# Patient Record
Sex: FEMALE | Race: OTHER | HISPANIC OR LATINO | URBAN - METROPOLITAN AREA
[De-identification: names, ages, dates, MRNs, and addresses within clinical notes are randomized per-mention and may not be internally consistent; named-entity substitution may affect disease eponyms.]

---

## 2023-04-20 ENCOUNTER — LAB OUTSIDE AN ENCOUNTER (OUTPATIENT)
Dept: URBAN - METROPOLITAN AREA CLINIC 98 | Facility: CLINIC | Age: 53
End: 2023-04-20

## 2023-04-20 ENCOUNTER — WEB ENCOUNTER (OUTPATIENT)
Dept: URBAN - METROPOLITAN AREA CLINIC 98 | Facility: CLINIC | Age: 53
End: 2023-04-20

## 2023-04-20 ENCOUNTER — OFFICE VISIT (OUTPATIENT)
Dept: URBAN - METROPOLITAN AREA CLINIC 98 | Facility: CLINIC | Age: 53
End: 2023-04-20
Payer: COMMERCIAL

## 2023-04-20 VITALS
TEMPERATURE: 97.5 F | HEART RATE: 82 BPM | BODY MASS INDEX: 32.7 KG/M2 | DIASTOLIC BLOOD PRESSURE: 82 MMHG | HEIGHT: 58 IN | WEIGHT: 155.8 LBS | SYSTOLIC BLOOD PRESSURE: 124 MMHG

## 2023-04-20 DIAGNOSIS — E88.81 METABOLIC SYNDROME: ICD-10-CM

## 2023-04-20 DIAGNOSIS — K21.9 GERD WITHOUT ESOPHAGITIS: ICD-10-CM

## 2023-04-20 DIAGNOSIS — K82.8 BILIARY DYSKINESIA: ICD-10-CM

## 2023-04-20 DIAGNOSIS — Z12.11 COLON CANCER SCREENING: ICD-10-CM

## 2023-04-20 PROCEDURE — 99203 OFFICE O/P NEW LOW 30 MIN: CPT | Performed by: INTERNAL MEDICINE

## 2023-04-20 PROCEDURE — 99243 OFF/OP CNSLTJ NEW/EST LOW 30: CPT | Performed by: INTERNAL MEDICINE

## 2023-04-20 RX ORDER — DEXLANSOPRAZOLE 30 MG/1
1 CAPSULE CAPSULE, DELAYED RELEASE ORAL ONCE A DAY
Qty: 30 | Refills: 3 | OUTPATIENT
Start: 2023-04-23

## 2023-04-20 RX ORDER — SODIUM, POTASSIUM,MAG SULFATES 17.5-3.13G
177ML SOLUTION, RECONSTITUTED, ORAL ORAL AS DIRECTED
Qty: 1 | Refills: 0 | OUTPATIENT
Start: 2023-04-20 | End: 2023-04-22

## 2023-04-20 RX ORDER — PRAVASTATIN SODIUM 20 MG/1
1 TABLET TABLET ORAL ONCE A DAY
Status: ACTIVE | COMMUNITY
Start: 2023-04-23

## 2023-04-20 NOTE — HPI-TODAY'S VISIT:
Patient referred by Rosas Lira MD for evaluation of chornic GERD and CRC screening. Copy of this consult OV sent to Dr. Lira. 53 yo pt e chronic GERD wo alalrm nor noctrunal JIMENA sxs who lately has been c/o frequent throat clearing, sore throat and intermittent dysphagia to solids wo food impaction.  No response to Omrprazole 40 mg p qd. No melenic stools and no hematochezia. Hasn't been able to lose much weight. Intermittent nocturnal JIMENA sxs wo extraesophageal sxs. She's due for CRC screening. No FHx CC / pp  IBD / GI malignancies. Normasl CPE a tr ago. No other complaints.

## 2023-04-21 ENCOUNTER — TELEPHONE ENCOUNTER (OUTPATIENT)
Dept: URBAN - METROPOLITAN AREA CLINIC 98 | Facility: CLINIC | Age: 53
End: 2023-04-21

## 2023-04-21 RX ORDER — DEXLANSOPRAZOLE 30 MG/1
1 CAPSULE CAPSULE, DELAYED RELEASE ORAL ONCE A DAY
Qty: 30 | Refills: 3 | OUTPATIENT
Start: 2023-04-22

## 2023-04-23 ENCOUNTER — DASHBOARD ENCOUNTERS (OUTPATIENT)
Age: 53
End: 2023-04-23

## 2023-04-23 PROBLEM — 266435005: Status: ACTIVE | Noted: 2023-04-20

## 2023-04-23 PROBLEM — 237602007: Status: ACTIVE | Noted: 2023-04-23

## 2023-04-26 ENCOUNTER — TELEPHONE ENCOUNTER (OUTPATIENT)
Dept: URBAN - METROPOLITAN AREA CLINIC 98 | Facility: CLINIC | Age: 53
End: 2023-04-26

## 2023-05-05 ENCOUNTER — TELEPHONE ENCOUNTER (OUTPATIENT)
Dept: URBAN - METROPOLITAN AREA CLINIC 98 | Facility: CLINIC | Age: 53
End: 2023-05-05

## 2023-05-05 RX ORDER — RABEPRAZOLE SODIUM 20 MG/1
1 TABLET TABLET, DELAYED RELEASE ORAL ONCE A DAY
Qty: 30 | Refills: 3 | OUTPATIENT
Start: 2023-05-15

## 2023-07-06 ENCOUNTER — OFFICE VISIT (OUTPATIENT)
Dept: URBAN - METROPOLITAN AREA SURGERY CENTER 18 | Facility: SURGERY CENTER | Age: 53
End: 2023-07-06
Payer: COMMERCIAL

## 2023-07-06 DIAGNOSIS — Z12.11 COLON CANCER SCREENING: ICD-10-CM

## 2023-07-06 PROCEDURE — 992 NON-BILLABLE: Performed by: INTERNAL MEDICINE

## 2023-07-06 RX ORDER — DEXLANSOPRAZOLE 30 MG/1
1 CAPSULE CAPSULE, DELAYED RELEASE ORAL ONCE A DAY
Qty: 30 | Refills: 3 | Status: ACTIVE | COMMUNITY
Start: 2023-04-22

## 2023-07-06 RX ORDER — RABEPRAZOLE SODIUM 20 MG/1
1 TABLET TABLET, DELAYED RELEASE ORAL ONCE A DAY
Qty: 30 | Refills: 3 | Status: ACTIVE | COMMUNITY
Start: 2023-05-15

## 2023-07-06 RX ORDER — PRAVASTATIN SODIUM 20 MG/1
1 TABLET TABLET ORAL ONCE A DAY
Status: ACTIVE | COMMUNITY
Start: 2023-04-23

## 2023-07-06 RX ORDER — DEXLANSOPRAZOLE 30 MG/1
1 CAPSULE CAPSULE, DELAYED RELEASE ORAL ONCE A DAY
Qty: 30 | Refills: 3 | Status: ACTIVE | COMMUNITY
Start: 2023-04-23

## 2024-07-31 ENCOUNTER — TELEPHONE ENCOUNTER (OUTPATIENT)
Dept: URBAN - METROPOLITAN AREA CLINIC 98 | Facility: CLINIC | Age: 54
End: 2024-07-31

## 2024-08-02 ENCOUNTER — ERX REFILL RESPONSE (OUTPATIENT)
Dept: URBAN - METROPOLITAN AREA CLINIC 98 | Facility: CLINIC | Age: 54
End: 2024-08-02

## 2024-08-02 RX ORDER — RABEPRAZOLE SODIUM 20 MG/1
1 TABLET TABLET, DELAYED RELEASE ORAL ONCE A DAY
Qty: 30 | Refills: 3 | OUTPATIENT

## 2024-08-02 RX ORDER — RABEPRAZOLE SODIUM 20 MG/1
TAKE 1 TABLET BY MOUTH ONCE DAILY FOR 30 DAYS TABLET, DELAYED RELEASE ORAL
Qty: 30 TABLET | Refills: 0 | OUTPATIENT

## 2024-08-13 ENCOUNTER — LAB OUTSIDE AN ENCOUNTER (OUTPATIENT)
Dept: URBAN - METROPOLITAN AREA CLINIC 98 | Facility: CLINIC | Age: 54
End: 2024-08-13

## 2024-08-13 ENCOUNTER — OFFICE VISIT (OUTPATIENT)
Dept: URBAN - METROPOLITAN AREA CLINIC 98 | Facility: CLINIC | Age: 54
End: 2024-08-13
Payer: COMMERCIAL

## 2024-08-13 VITALS
DIASTOLIC BLOOD PRESSURE: 94 MMHG | TEMPERATURE: 98.1 F | HEIGHT: 58 IN | BODY MASS INDEX: 33.58 KG/M2 | SYSTOLIC BLOOD PRESSURE: 153 MMHG | WEIGHT: 160 LBS | HEART RATE: 98.1 BPM

## 2024-08-13 DIAGNOSIS — Z12.11 COLON CANCER SCREENING: ICD-10-CM

## 2024-08-13 DIAGNOSIS — K21.9 CHRONIC GERD: ICD-10-CM

## 2024-08-13 PROBLEM — 235595009: Status: ACTIVE | Noted: 2024-08-13

## 2024-08-13 PROCEDURE — 99214 OFFICE O/P EST MOD 30 MIN: CPT | Performed by: INTERNAL MEDICINE

## 2024-08-13 RX ORDER — SODIUM, POTASSIUM,MAG SULFATES 17.5-3.13G
354ML SOLUTION, RECONSTITUTED, ORAL ORAL
Qty: 345 MILLILITER | Refills: 0 | OUTPATIENT
Start: 2024-08-13 | End: 2024-08-14

## 2024-08-13 RX ORDER — DEXLANSOPRAZOLE 30 MG/1
1 CAPSULE CAPSULE, DELAYED RELEASE ORAL ONCE A DAY
Qty: 30 | Refills: 3 | Status: ON HOLD | COMMUNITY
Start: 2023-04-23

## 2024-08-13 RX ORDER — DEXLANSOPRAZOLE 30 MG/1
1 CAPSULE CAPSULE, DELAYED RELEASE ORAL ONCE A DAY
Qty: 30 | Refills: 3 | Status: ON HOLD | COMMUNITY
Start: 2023-04-22

## 2024-08-13 RX ORDER — RABEPRAZOLE SODIUM 20 MG/1
TAKE 1 TABLET BY MOUTH ONCE DAILY FOR 30 DAYS TABLET, DELAYED RELEASE ORAL
Qty: 30 TABLET | Refills: 0 | Status: ON HOLD | COMMUNITY

## 2024-08-13 RX ORDER — PRAVASTATIN SODIUM 20 MG/1
1 TABLET TABLET ORAL ONCE A DAY
Status: ACTIVE | COMMUNITY
Start: 2023-04-23

## 2024-08-13 RX ORDER — OMEPRAZOLE 40 MG/1
1 CAPSULE 30 MINUTES BEFORE MORNING MEAL CAPSULE, DELAYED RELEASE ORAL ONCE A DAY
Qty: 90 | Refills: 3 | Status: ON HOLD | COMMUNITY
Start: 2024-08-01

## 2024-08-13 NOTE — HPI-TODAY'S VISIT:
Patient referred by Rosas Lira MD for evaluation of chronic GERD and CRC screening. Copy of this consult OV sent to Dr. Lira. 54 yo pt w chronic GERD, w gas, flatus, wo alarm nor nocturnal JIMENA sxs who lately has been c/o sore throat and intermittent dysphagia to solids wo food impaction. Partial response to Omeprazole 40 mg p qd. No melenic stools and no hematochezia. Has been Dx w DM2 on Ozempic and Insulin + ADA diet.  Hasn't been able to lose some  weight.  She's due for CRC screening. No FHx CC / pp  IBD / GI malignancies. Normal CPE 3 mos ago x for DM2.Denies cardiorespiratory sxs.  No other complaints.

## 2024-10-03 ENCOUNTER — TELEPHONE ENCOUNTER (OUTPATIENT)
Dept: URBAN - METROPOLITAN AREA CLINIC 98 | Facility: CLINIC | Age: 54
End: 2024-10-03

## 2024-10-07 ENCOUNTER — CLAIMS CREATED FROM THE CLAIM WINDOW (OUTPATIENT)
Dept: URBAN - METROPOLITAN AREA SURGERY CENTER 18 | Facility: SURGERY CENTER | Age: 54
End: 2024-10-07
Payer: COMMERCIAL

## 2024-10-07 ENCOUNTER — CLAIMS CREATED FROM THE CLAIM WINDOW (OUTPATIENT)
Dept: URBAN - METROPOLITAN AREA CLINIC 4 | Facility: CLINIC | Age: 54
End: 2024-10-07
Payer: COMMERCIAL

## 2024-10-07 DIAGNOSIS — Z12.11 COLON CANCER SCREENING: ICD-10-CM

## 2024-10-07 DIAGNOSIS — K63.89 OTHER SPECIFIED DISEASES OF INTESTINE: ICD-10-CM

## 2024-10-07 DIAGNOSIS — Z12.11 COLON CANCER SCREENING (HIGH RISK): ICD-10-CM

## 2024-10-07 DIAGNOSIS — K21.9 GERD: ICD-10-CM

## 2024-10-07 DIAGNOSIS — K63.89 BACTERIAL OVERGROWTH SYNDROME: ICD-10-CM

## 2024-10-07 DIAGNOSIS — K31.A11 GASTRIC INTESTINAL METAPLASIA WITHOUT DYSPLASIA, INVOLVING THE ANTRUM: ICD-10-CM

## 2024-10-07 DIAGNOSIS — D12.2 ADENOMATOUS POLYP OF ASCENDING COLON: ICD-10-CM

## 2024-10-07 DIAGNOSIS — K31.89 ACHYLIA: ICD-10-CM

## 2024-10-07 DIAGNOSIS — K21.9 ACID REFLUX: ICD-10-CM

## 2024-10-07 DIAGNOSIS — K29.70 GASTRITIS: ICD-10-CM

## 2024-10-07 DIAGNOSIS — K21.9 GASTRO-ESOPHAGEAL REFLUX DISEASE WITHOUT ESOPHAGITIS: ICD-10-CM

## 2024-10-07 DIAGNOSIS — K31.89 OTHER DISEASES OF STOMACH AND DUODENUM: ICD-10-CM

## 2024-10-07 PROCEDURE — 88305 TISSUE EXAM BY PATHOLOGIST: CPT | Performed by: PATHOLOGY

## 2024-10-07 PROCEDURE — 45380 COLONOSCOPY AND BIOPSY: CPT | Performed by: INTERNAL MEDICINE

## 2024-10-07 PROCEDURE — 43239 EGD BIOPSY SINGLE/MULTIPLE: CPT | Performed by: INTERNAL MEDICINE

## 2024-10-07 PROCEDURE — 88342 IMHCHEM/IMCYTCHM 1ST ANTB: CPT | Performed by: PATHOLOGY

## 2024-10-07 PROCEDURE — 00813 ANES UPR LWR GI NDSC PX: CPT | Performed by: NURSE ANESTHETIST, CERTIFIED REGISTERED

## 2024-10-07 PROCEDURE — 88312 SPECIAL STAINS GROUP 1: CPT | Performed by: PATHOLOGY

## 2024-10-07 RX ORDER — OMEPRAZOLE 40 MG/1
1 CAPSULE 30 MINUTES BEFORE MORNING MEAL CAPSULE, DELAYED RELEASE ORAL ONCE A DAY
Qty: 90 | Refills: 3 | Status: ON HOLD | COMMUNITY
Start: 2024-08-01

## 2024-10-07 RX ORDER — DEXLANSOPRAZOLE 30 MG/1
1 CAPSULE CAPSULE, DELAYED RELEASE ORAL ONCE A DAY
Qty: 30 | Refills: 3 | Status: ON HOLD | COMMUNITY
Start: 2023-04-22

## 2024-10-07 RX ORDER — PRAVASTATIN SODIUM 20 MG/1
1 TABLET TABLET ORAL ONCE A DAY
Status: ACTIVE | COMMUNITY
Start: 2023-04-23

## 2024-10-07 RX ORDER — DEXLANSOPRAZOLE 30 MG/1
1 CAPSULE CAPSULE, DELAYED RELEASE ORAL ONCE A DAY
Qty: 30 | Refills: 3 | Status: ON HOLD | COMMUNITY
Start: 2023-04-23

## 2024-10-07 RX ORDER — RABEPRAZOLE SODIUM 20 MG/1
TAKE 1 TABLET BY MOUTH ONCE DAILY FOR 30 DAYS TABLET, DELAYED RELEASE ORAL
Qty: 30 TABLET | Refills: 0 | Status: ON HOLD | COMMUNITY

## 2024-10-11 ENCOUNTER — ERX REFILL RESPONSE (OUTPATIENT)
Dept: URBAN - METROPOLITAN AREA CLINIC 98 | Facility: CLINIC | Age: 54
End: 2024-10-11

## 2024-10-11 RX ORDER — RABEPRAZOLE SODIUM 20 MG/1
TAKE 1 TABLET BY MOUTH ONCE DAILY TABLET, DELAYED RELEASE ORAL
Qty: 30 TABLET | Refills: 0 | OUTPATIENT

## 2024-10-11 RX ORDER — RABEPRAZOLE SODIUM 20 MG/1
TAKE 1 TABLET BY MOUTH ONCE DAILY FOR 30 DAYS TABLET, DELAYED RELEASE ORAL
Qty: 30 TABLET | Refills: 0 | OUTPATIENT

## 2024-10-15 ENCOUNTER — OFFICE VISIT (OUTPATIENT)
Dept: URBAN - METROPOLITAN AREA TELEHEALTH 2 | Facility: TELEHEALTH | Age: 54
End: 2024-10-15
Payer: COMMERCIAL

## 2024-10-15 DIAGNOSIS — K21.9 CHRONIC GERD: ICD-10-CM

## 2024-10-15 DIAGNOSIS — Z12.11 COLON CANCER SCREENING: ICD-10-CM

## 2024-10-15 PROCEDURE — 99214 OFFICE O/P EST MOD 30 MIN: CPT | Performed by: INTERNAL MEDICINE

## 2024-10-15 RX ORDER — DEXLANSOPRAZOLE 30 MG/1
1 CAPSULE CAPSULE, DELAYED RELEASE ORAL ONCE A DAY
Qty: 30 | Refills: 3 | Status: ON HOLD | COMMUNITY
Start: 2023-04-23

## 2024-10-15 RX ORDER — RABEPRAZOLE SODIUM 20 MG/1
TAKE 1 TABLET BY MOUTH ONCE DAILY TABLET, DELAYED RELEASE ORAL
Qty: 30 TABLET | Refills: 0 | Status: ACTIVE | COMMUNITY

## 2024-10-15 RX ORDER — OMEPRAZOLE 40 MG/1
1 CAPSULE 30 MINUTES BEFORE MORNING MEAL CAPSULE, DELAYED RELEASE ORAL ONCE A DAY
Qty: 90 | Refills: 3 | Status: ON HOLD | COMMUNITY
Start: 2024-08-01

## 2024-10-15 RX ORDER — PRAVASTATIN SODIUM 20 MG/1
1 TABLET TABLET ORAL ONCE A DAY
Status: ACTIVE | COMMUNITY
Start: 2023-04-23

## 2024-10-15 RX ORDER — DEXLANSOPRAZOLE 30 MG/1
1 CAPSULE CAPSULE, DELAYED RELEASE ORAL ONCE A DAY
Qty: 30 | Refills: 3 | Status: ON HOLD | COMMUNITY
Start: 2023-04-22

## 2024-10-15 NOTE — HPI-TODAY'S VISIT:
This is a Telehealth OV to which patient has agreed to. Limitations of TH discussed; patient understands and agrees to proceed. Pt's at home during this virtual OV. 54 yo pt w chronic GERD, here for follow-up after EGD / Colonoscopy. Better overall w current treatment. EGD 10/24: GERD, sm HH, Hp- negative chronic inactive gastritis and normal duodenal bx's. Colonoscopy 10/24: sigmoid spasm and lymphoid aggregate. Partial response to Omeprazole 40 mg p qd, much better w current  Rabeprazole 20 mg po qd. No melenic stools and no hematochezia. Has been Dx w DM2 on Ozempic and Basalgar / Humulin + ADA diet.  Hasn't been able to lose some  weight.  She's due for CRC screening. No FHx CC / pp  IBD / GI malignancies. Normal CPE 3 mos ago x for DM2.Denies cardiorespiratory sxs.  No other complaints.

## 2024-10-24 ENCOUNTER — TELEPHONE ENCOUNTER (OUTPATIENT)
Dept: URBAN - METROPOLITAN AREA CLINIC 98 | Facility: CLINIC | Age: 54
End: 2024-10-24

## 2024-11-15 ENCOUNTER — ERX REFILL RESPONSE (OUTPATIENT)
Dept: URBAN - METROPOLITAN AREA CLINIC 98 | Facility: CLINIC | Age: 54
End: 2024-11-15

## 2024-11-15 RX ORDER — RABEPRAZOLE SODIUM 20 MG/1
TAKE 1 TABLET BY MOUTH ONCE DAILY TABLET, DELAYED RELEASE ORAL
Qty: 30 TABLET | Refills: 0 | OUTPATIENT